# Patient Record
Sex: MALE | Race: WHITE | NOT HISPANIC OR LATINO | Employment: FULL TIME | URBAN - NONMETROPOLITAN AREA
[De-identification: names, ages, dates, MRNs, and addresses within clinical notes are randomized per-mention and may not be internally consistent; named-entity substitution may affect disease eponyms.]

---

## 2023-07-08 ENCOUNTER — HOSPITAL ENCOUNTER (EMERGENCY)
Facility: HOSPITAL | Age: 33
Discharge: HOME | End: 2023-07-08
Attending: EMERGENCY MEDICINE
Payer: COMMERCIAL

## 2023-07-08 ENCOUNTER — APPOINTMENT (EMERGENCY)
Dept: RADIOLOGY | Facility: HOSPITAL | Age: 33
End: 2023-07-08
Payer: COMMERCIAL

## 2023-07-08 VITALS
BODY MASS INDEX: 36.36 KG/M2 | TEMPERATURE: 97.8 F | RESPIRATION RATE: 20 BRPM | SYSTOLIC BLOOD PRESSURE: 129 MMHG | HEIGHT: 74 IN | DIASTOLIC BLOOD PRESSURE: 62 MMHG | WEIGHT: 283.29 LBS | HEART RATE: 92 BPM | OXYGEN SATURATION: 95 %

## 2023-07-08 DIAGNOSIS — D69.6 THROMBOCYTOPENIA (HCC): ICD-10-CM

## 2023-07-08 DIAGNOSIS — F10.90 ALCOHOL USE DISORDER: ICD-10-CM

## 2023-07-08 DIAGNOSIS — R17 JAUNDICE: ICD-10-CM

## 2023-07-08 DIAGNOSIS — K75.9 HEPATITIS: ICD-10-CM

## 2023-07-08 DIAGNOSIS — R53.1 GENERAL WEAKNESS: Primary | ICD-10-CM

## 2023-07-08 DIAGNOSIS — R11.10 VOMITING: ICD-10-CM

## 2023-07-08 LAB
ALBUMIN SERPL BCP-MCNC: 3.9 G/DL (ref 3.5–5)
ALP SERPL-CCNC: 88 U/L (ref 34–104)
ALT SERPL W P-5'-P-CCNC: 46 U/L (ref 7–52)
ANION GAP SERPL CALCULATED.3IONS-SCNC: 15 MMOL/L
APTT PPP: 43 SECONDS (ref 23–37)
AST SERPL W P-5'-P-CCNC: 135 U/L (ref 13–39)
BASOPHILS # BLD AUTO: 0.05 THOUSANDS/ÂΜL (ref 0–0.1)
BASOPHILS NFR BLD AUTO: 1 % (ref 0–1)
BILIRUB SERPL-MCNC: 9.99 MG/DL (ref 0.2–1)
BUN SERPL-MCNC: 8 MG/DL (ref 5–25)
CALCIUM SERPL-MCNC: 9.2 MG/DL (ref 8.4–10.2)
CARDIAC TROPONIN I PNL SERPL HS: 23 NG/L
CHLORIDE SERPL-SCNC: 94 MMOL/L (ref 96–108)
CO2 SERPL-SCNC: 22 MMOL/L (ref 21–32)
CREAT SERPL-MCNC: 0.78 MG/DL (ref 0.6–1.3)
EOSINOPHIL # BLD AUTO: 0 THOUSAND/ÂΜL (ref 0–0.61)
EOSINOPHIL NFR BLD AUTO: 0 % (ref 0–6)
ERYTHROCYTE [DISTWIDTH] IN BLOOD BY AUTOMATED COUNT: 13.6 % (ref 11.6–15.1)
GFR SERPL CREATININE-BSD FRML MDRD: 117 ML/MIN/1.73SQ M
GLUCOSE SERPL-MCNC: 94 MG/DL (ref 65–140)
HCT VFR BLD AUTO: 41.8 % (ref 36.5–49.3)
HGB BLD-MCNC: 15 G/DL (ref 12–17)
IMM GRANULOCYTES # BLD AUTO: 0.04 THOUSAND/UL (ref 0–0.2)
IMM GRANULOCYTES NFR BLD AUTO: 1 % (ref 0–2)
INR PPP: 1.85 (ref 0.84–1.19)
LIPASE SERPL-CCNC: 7 U/L (ref 11–82)
LYMPHOCYTES # BLD AUTO: 1.64 THOUSANDS/ÂΜL (ref 0.6–4.47)
LYMPHOCYTES NFR BLD AUTO: 22 % (ref 14–44)
MCH RBC QN AUTO: 34.8 PG (ref 26.8–34.3)
MCHC RBC AUTO-ENTMCNC: 35.9 G/DL (ref 31.4–37.4)
MCV RBC AUTO: 97 FL (ref 82–98)
MONOCYTES # BLD AUTO: 0.76 THOUSAND/ÂΜL (ref 0.17–1.22)
MONOCYTES NFR BLD AUTO: 10 % (ref 4–12)
NEUTROPHILS # BLD AUTO: 4.93 THOUSANDS/ÂΜL (ref 1.85–7.62)
NEUTS SEG NFR BLD AUTO: 66 % (ref 43–75)
NRBC BLD AUTO-RTO: 0 /100 WBCS
PLATELET # BLD AUTO: 125 THOUSANDS/UL (ref 149–390)
PMV BLD AUTO: 10.2 FL (ref 8.9–12.7)
POTASSIUM SERPL-SCNC: 3.3 MMOL/L (ref 3.5–5.3)
PROT SERPL-MCNC: 8.4 G/DL (ref 6.4–8.4)
PROTHROMBIN TIME: 21.4 SECONDS (ref 11.6–14.5)
RBC # BLD AUTO: 4.31 MILLION/UL (ref 3.88–5.62)
SODIUM SERPL-SCNC: 131 MMOL/L (ref 135–147)
WBC # BLD AUTO: 7.42 THOUSAND/UL (ref 4.31–10.16)

## 2023-07-08 PROCEDURE — 84484 ASSAY OF TROPONIN QUANT: CPT

## 2023-07-08 PROCEDURE — 93005 ELECTROCARDIOGRAM TRACING: CPT

## 2023-07-08 PROCEDURE — 36415 COLL VENOUS BLD VENIPUNCTURE: CPT

## 2023-07-08 PROCEDURE — 80053 COMPREHEN METABOLIC PANEL: CPT

## 2023-07-08 PROCEDURE — 85610 PROTHROMBIN TIME: CPT | Performed by: EMERGENCY MEDICINE

## 2023-07-08 PROCEDURE — 71045 X-RAY EXAM CHEST 1 VIEW: CPT

## 2023-07-08 PROCEDURE — 99285 EMERGENCY DEPT VISIT HI MDM: CPT

## 2023-07-08 PROCEDURE — 85730 THROMBOPLASTIN TIME PARTIAL: CPT | Performed by: EMERGENCY MEDICINE

## 2023-07-08 PROCEDURE — 96361 HYDRATE IV INFUSION ADD-ON: CPT

## 2023-07-08 PROCEDURE — 96374 THER/PROPH/DIAG INJ IV PUSH: CPT

## 2023-07-08 PROCEDURE — 85025 COMPLETE CBC W/AUTO DIFF WBC: CPT

## 2023-07-08 PROCEDURE — 83690 ASSAY OF LIPASE: CPT | Performed by: EMERGENCY MEDICINE

## 2023-07-08 RX ORDER — CHLORDIAZEPOXIDE HYDROCHLORIDE 25 MG/1
25-50 CAPSULE, GELATIN COATED ORAL 3 TIMES DAILY PRN
Qty: 3 CAPSULE | Refills: 0 | Status: SHIPPED | OUTPATIENT
Start: 2023-07-08 | End: 2023-07-18

## 2023-07-08 RX ORDER — LISINOPRIL 10 MG/1
10 TABLET ORAL DAILY
Qty: 30 TABLET | Refills: 0 | Status: SHIPPED | OUTPATIENT
Start: 2023-07-08

## 2023-07-08 RX ORDER — ONDANSETRON 2 MG/ML
4 INJECTION INTRAMUSCULAR; INTRAVENOUS ONCE
Status: COMPLETED | OUTPATIENT
Start: 2023-07-08 | End: 2023-07-08

## 2023-07-08 RX ORDER — ONDANSETRON 4 MG/1
4 TABLET, FILM COATED ORAL EVERY 6 HOURS PRN
Qty: 10 TABLET | Refills: 0 | Status: SHIPPED | OUTPATIENT
Start: 2023-07-08

## 2023-07-08 RX ORDER — CHLORDIAZEPOXIDE HYDROCHLORIDE 25 MG/1
25-50 CAPSULE, GELATIN COATED ORAL 3 TIMES DAILY PRN
Qty: 10 CAPSULE | Refills: 0 | Status: SHIPPED | OUTPATIENT
Start: 2023-07-08 | End: 2023-07-08

## 2023-07-08 RX ADMIN — ONDANSETRON 4 MG: 2 INJECTION INTRAMUSCULAR; INTRAVENOUS at 08:27

## 2023-07-08 RX ADMIN — SODIUM CHLORIDE 1000 ML: 0.9 INJECTION, SOLUTION INTRAVENOUS at 08:26

## 2023-07-08 NOTE — DISCHARGE INSTRUCTIONS
Return immediately if worse or any new symptoms  Please discontinue alcohol use  Please arrange to see your primary clinician as soon as possible and discuss gastroenterologist specialist or hepatologist evaluation

## 2023-07-08 NOTE — ED PROVIDER NOTES
History  Chief Complaint   Patient presents with   • Chest Pain     Pt c/o intermittent chest pain w/dizzinesss starting at 0400 this morning. Pt mentioned not feeling well yesterday c/o fatigue, nausea, vomiting, and diarrhea and thinks he is dehydrated. Hx htn. Ran out of bp meds and needs to find new pcp. 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 No bleeding. No fever or chills. Slept poorly, repeated vomiting and generally feels weak. Brief episode of retrosternal chest discomfort 4:00 in the morning with dizziness. No history of CAD, CVA or VTE. Recently ran out of antihypertensive. Missed appointment with family practitioner yesterday, no evaluation in about 1 year. Notes he has been drinking alcohol about 6-12 12 oz beer daily for at least 5 years and recently consider help with discontinuing. Notes he can go 1-2 days without. History provided by:  Patient  Chest Pain  Pain location:  Substernal area  Pain quality: dull    Pain radiates to:  Does not radiate  Pain radiates to the back: no    Pain severity:  Mild  Onset quality:  Unable to specify  Timing:  Constant  Progression:  Resolved  Chronicity:  New  Context: at rest    Relieved by:  None tried  Worsened by:  Nothing tried  Ineffective treatments:  None tried  Associated symptoms: abdominal pain, dizziness, fatigue, nausea, vomiting and weakness    Associated symptoms: no fever, no lower extremity edema and no numbness    Risk factors: hypertension and obesity    Risk factors: no coronary artery disease and no prior DVT/PE        None       Past Medical History:   Diagnosis Date   • Hypertension        History reviewed. No pertinent surgical history. History reviewed. No pertinent family history.   I have reviewed and agree with the history as documented. E-Cigarette/Vaping   • E-Cigarette Use Never User      E-Cigarette/Vaping Substances     Social History     Tobacco Use   • Smoking status: Former     Types: Cigarettes     Quit date:      Years since quittin.5   • Smokeless tobacco: Never   Vaping Use   • Vaping Use: Never used   Substance Use Topics   • Alcohol use: Yes     Comment: 6-12 beers daily   • Drug use: Not Currently       Review of Systems   Constitutional: Positive for fatigue. Negative for fever. Cardiovascular: Positive for chest pain. Gastrointestinal: Positive for abdominal pain, nausea and vomiting. Neurological: Positive for dizziness and weakness. Negative for numbness. All other systems reviewed and are negative. Physical Exam  Physical Exam  Vitals and nursing note reviewed. Constitutional:       Comments: Pleasant, comfortable-appearing   HENT:      Head: Normocephalic and atraumatic. Mouth/Throat:      Mouth: Mucous membranes are moist.      Pharynx: Oropharynx is clear. Eyes:      Conjunctiva/sclera: Conjunctivae normal.      Pupils: Pupils are equal, round, and reactive to light. Comments: Sclera appear mildly icteric bilaterally   Cardiovascular:      Rate and Rhythm: Regular rhythm. Tachycardia present. Heart sounds: Normal heart sounds. Pulmonary:      Effort: Pulmonary effort is normal.      Breath sounds: Normal breath sounds. Chest:      Chest wall: No mass or tenderness. Abdominal:      General: Bowel sounds are normal. There is no distension. Palpations: Abdomen is soft. There is no hepatomegaly or mass. Tenderness: There is no abdominal tenderness. There is no guarding. Musculoskeletal:         General: No deformity. Cervical back: Neck supple. Right lower leg: No tenderness. No edema. Left lower leg: No tenderness. No edema. Skin:     General: Skin is warm and dry. Neurological:      General: No focal deficit present.       Mental Status: He is alert and oriented to person, place, and time. Cranial Nerves: No cranial nerve deficit. Coordination: Coordination normal.   Psychiatric:         Behavior: Behavior normal.         Thought Content: Thought content normal.         Judgment: Judgment normal.         Vital Signs  ED Triage Vitals [07/08/23 0733]   Temperature Pulse Respirations Blood Pressure SpO2   97.8 °F (36.6 °C) 95 18 162/87 99 %      Temp src Heart Rate Source Patient Position - Orthostatic VS BP Location FiO2 (%)   -- Monitor Lying Right arm --      Pain Score       No Pain           Vitals:    07/08/23 0733 07/08/23 0830 07/08/23 0845   BP: 162/87 153/84 129/62   Pulse: 95 99 92   Patient Position - Orthostatic VS: Lying           Visual Acuity      ED Medications  Medications   ondansetron (ZOFRAN) injection 4 mg (4 mg Intravenous Given 7/8/23 0827)   sodium chloride 0.9 % bolus 1,000 mL (1,000 mL Intravenous New Bag 7/8/23 0826)       Diagnostic Studies  Results Reviewed     Procedure Component Value Units Date/Time    Lipase [[id number]]  (Abnormal) Collected: 07/08/23 0738    Lab Status: Final result Specimen: Blood from Arm, Right Updated: 07/08/23 0847     Lipase 7 u/L     Narrative:      Specimen Icteric.     Protime-INR [[id number]]  (Abnormal) Collected: 07/08/23 0738    Lab Status: Final result Specimen: Blood from Arm, Right Updated: 07/08/23 0839     Protime 21.4 seconds      INR 1.85    APTT [[id number]]  (Abnormal) Collected: 07/08/23 0738    Lab Status: Final result Specimen: Blood from Arm, Right Updated: 07/08/23 0839     PTT 43 seconds     HS Troponin 0hr (reflex protocol) [[id number]]  (Normal) Collected: 07/08/23 0738    Lab Status: Final result Specimen: Blood from Arm, Right Updated: 07/08/23 0815     hs TnI 0hr 23 ng/L     Comprehensive metabolic panel [[id number]]  (Abnormal) Collected: 07/08/23 0738    Lab Status: Final result Specimen: Blood from Arm, Right Updated: 07/08/23 0809     Sodium 131 mmol/L Potassium 3.3 mmol/L      Chloride 94 mmol/L      CO2 22 mmol/L      ANION GAP 15 mmol/L      BUN 8 mg/dL      Creatinine 0.78 mg/dL      Glucose 94 mg/dL      Calcium 9.2 mg/dL       U/L      ALT 46 U/L      Alkaline Phosphatase 88 U/L      Total Protein 8.4 g/dL      Albumin 3.9 g/dL      Total Bilirubin 9.99 mg/dL      eGFR 117 ml/min/1.73sq m     Narrative:      Specimen Icteric.   National Kid[employer] Disease Foundation guidelines for Chronic Kidney Disease (CKD):   •  Stage 1 with normal or high GFR (GFR > 90 mL/min/1.73 square meters)  •  Stage 2 Mild CKD (GFR = 60-89 mL/min/1.73 square meters)  •  Stage 3A Moderate CKD (GFR = 45-59 mL/min/1.73 square meters)  •  Stage 3B Moderate CKD (GFR = 30-44 mL/min/1.73 square meters)  •  Stage 4 Severe CKD (GFR = 15-29 mL/min/1.73 square meters)  •  Stage 5 End Stage CKD (GFR <15 mL/min/1.73 square meters)  Note: GFR calculation is accurate only with a steady state creatinine    CBC and differential [[id number]]  (Abnormal) Collected: 07/08/23 0738    Lab Status: Final result Specimen: Blood from Arm, Right Updated: 07/08/23 0751     WBC 7.42 Thousand/uL      RBC 4.31 Million/uL      Hemoglobin 15.0 g/dL      Hematocrit 41.8 %      MCV 97 fL      MCH 34.8 pg      MCHC 35.9 g/dL      RDW 13.6 %      MPV 10.2 fL      Platelets 176 Thousands/uL      nRBC 0 /100 WBCs      Neutrophils Relative 66 %      Immat GRANS % 1 %      Lymphocytes Relative 22 %      Monocytes Relative 10 %      Eosinophils Relative 0 %      Basophils Relative 1 %      Neutrophils Absolute 4.93 Thousands/µL      Immature Grans Absolute 0.04 Thousand/uL      Lymphocytes Absolute 1.64 Thousands/µL      Monocytes Absolute 0.76 Thousand/µL      Eosinophils Absolute 0.00 Thousand/µL      Basophils Absolute 0.05 Thousands/µL                  XR chest 1 view portable   ED Interpretation by Rosa Isela Salazar DO (07/08 1932)   No cardiomegaly or effusions                 Procedures  Procedures         ED Course  ED Course as of 07/08/23 1032   Sat Jul 08, 2023   0802  normal sinus rhythm rate 84 normal axis intraventricular conduction delay QTc 524 no ST elevation or depression interpreted by me   0812 Sodium(!): 131   0812 Potassium(!): 3.3   0812 AST(!): 135   0812 ALT: 46   0812 TOTAL BILIRUBIN(!): 9.99   0812 WBC: 7.42   0812 Hemoglobin: 15.0   0812 Platelet Count(!): 465   0819 hs TnI 0hr: 23   0835 Likely chronic alcoholic hepatitis and related thrombocytopenia, mild hyponatremia and mild hypokalemia   0842 POCT INR(!): 1.85   0846 PROTIME(!): 21.4   0846 PTT(!): 43  Generally feels improved, rested. He does not describe history of alcohol related complications like seizures, no obvious prior withdrawal history or withdrawal medication use. We discussed results at length, he is stable for close outpatient follow-up and agreeable. Notes he will reside with friend locally and arrange ride to return to home in [place]. He agrees to follow-up with his primary clinician and discussed gastroenterology, hepatology, evaluation. He agrees to return immediately if worse or any new symptoms   1027 Again improved, notes his bottom hurts from sitting and very agreeable with close outpatient follow-up, will return immediately if worse or new symptoms                               SBIRT 20yo+    Flowsheet Row Most Recent Value   Initial Alcohol Screen: US AUDIT-C     1. How often do you have a drink containing alcohol? 0 Filed at: 07/08/2023 0808   2. How many drinks containing alcohol do you have on a typical day you are drinking? 0 Filed at: 07/08/2023 0808   3a. Male UNDER 65: How often do you have five or more drinks on one occasion? 0 Filed at: 07/08/2023 8624   Audit-C Score 0 Filed at: 07/08/2023 3305   IZAIAH: How many times in the past year have you. .. Used an illegal drug or used a prescription medication for non-medical reasons?  Never Filed at: 07/08/2023 9345 MDM    Disposition  Final diagnoses:   General weakness   Vomiting   Jaundice   Hepatitis   Thrombocytopenia ([address])   Alcohol use disorder     Time reflects when diagnosis was documented in both MDM as applicable and the Disposition within this note     Time User Action Codes Description Comment    7/8/2023  8:57 AM Veria Duck Add [R53.1] General weakness     7/8/2023  8:57 AM Veria Duck Add [R11.10] Vomiting     7/8/2023  8:57 AM Veria Duck Add [K75.9] Hepatitis     7/8/2023  8:57 AM Veria Duck Remove [K75.9] Hepatitis     7/8/2023  8:57 AM Veria Duck Add [R17] Jaundice     7/8/2023  8:57 AM Veria Duck Add [K75.9] Hepatitis     7/8/2023  8:58 AM Veria Duck Add [D69.6] Thrombocytopenia ([address])     7/8/2023  8:58 AM Veria Duck Add [F10.90] Alcohol use disorder       ED Disposition     ED Disposition   Discharge    Condition   Stable    Date/Time   Sat Jul 8, 2023  8:57 AM    Comment   Warner Peng discharge to home/self care. Follow-up Information    None         Patient's Medications   Discharge Prescriptions    CHLORDIAZEPOXIDE (LIBRIUM) 25 MG CAPSULE    Take 1-2 capsules (25-50 mg total) by mouth 3 (three) times a day as needed for anxiety for up to 10 days       Start Date: 7/8/2023  End Date: 7/18/2023       Order Dose: 25-50 mg       Quantity: 3 capsule    Refills: 0    LISINOPRIL (ZESTRIL) 10 MG TABLET    Take 1 tablet (10 mg total) by mouth daily       Start Date: 7/8/2023  End Date: --       Order Dose: 10 mg       Quantity: 30 tablet    Refills: 0    ONDANSETRON (ZOFRAN) 4 MG TABLET    Take 1 tablet (4 mg total) by mouth every 6 (six) hours as needed for nausea or vomiting       Start Date: 7/8/2023  End Date: --       Order Dose: 4 mg       Quantity: 10 tablet    Refills: 0       No discharge procedures on file.     PDMP Review     None          ED Provider  Electronically Signed by           Bety Sharma DO  07/08/23 3389

## 2023-07-10 LAB
ATRIAL RATE: 84 BPM
P AXIS: 17 DEGREES
PR INTERVAL: 156 MS
QRS AXIS: 27 DEGREES
QRSD INTERVAL: 114 MS
QT INTERVAL: 444 MS
QTC INTERVAL: 524 MS
T WAVE AXIS: 35 DEGREES
VENTRICULAR RATE: 84 BPM